# Patient Record
Sex: FEMALE | Race: WHITE | NOT HISPANIC OR LATINO | Employment: FULL TIME | URBAN - METROPOLITAN AREA
[De-identification: names, ages, dates, MRNs, and addresses within clinical notes are randomized per-mention and may not be internally consistent; named-entity substitution may affect disease eponyms.]

---

## 2022-09-11 ENCOUNTER — OFFICE VISIT (OUTPATIENT)
Dept: URGENT CARE | Facility: URGENT CARE | Age: 27
End: 2022-09-11
Payer: OTHER MISCELLANEOUS

## 2022-09-11 ENCOUNTER — NURSE TRIAGE (OUTPATIENT)
Dept: NURSING | Facility: CLINIC | Age: 27
End: 2022-09-11

## 2022-09-11 ENCOUNTER — ANCILLARY PROCEDURE (OUTPATIENT)
Dept: GENERAL RADIOLOGY | Facility: CLINIC | Age: 27
End: 2022-09-11
Attending: FAMILY MEDICINE
Payer: COMMERCIAL

## 2022-09-11 VITALS
TEMPERATURE: 98.6 F | OXYGEN SATURATION: 98 % | DIASTOLIC BLOOD PRESSURE: 85 MMHG | SYSTOLIC BLOOD PRESSURE: 130 MMHG | HEART RATE: 62 BPM

## 2022-09-11 DIAGNOSIS — S99.912A ANKLE INJURY, LEFT, INITIAL ENCOUNTER: Primary | ICD-10-CM

## 2022-09-11 PROCEDURE — 73610 X-RAY EXAM OF ANKLE: CPT | Mod: TC | Performed by: STUDENT IN AN ORGANIZED HEALTH CARE EDUCATION/TRAINING PROGRAM

## 2022-09-11 PROCEDURE — 99204 OFFICE O/P NEW MOD 45 MIN: CPT | Performed by: FAMILY MEDICINE

## 2022-09-11 RX ORDER — NORETHINDRONE ACETATE AND ETHINYL ESTRADIOL 1MG-20(21)
1 KIT ORAL DAILY
COMMUNITY
Start: 2022-01-13

## 2022-09-11 RX ORDER — MONTELUKAST SODIUM 10 MG/1
10 TABLET ORAL
COMMUNITY
Start: 2022-01-13

## 2022-09-11 RX ORDER — CETIRIZINE HYDROCHLORIDE 10 MG/1
10 TABLET ORAL
COMMUNITY
Start: 2022-01-13

## 2022-09-11 RX ORDER — KETOCONAZOLE 20 MG/ML
SHAMPOO TOPICAL
COMMUNITY
Start: 2022-01-13

## 2022-09-11 NOTE — PROGRESS NOTES
SUBJECTIVE:  Chief Complaint   Patient presents with     Urgent Care     Present for left ankle pain/injury during work on 09/07/2022.     Work Comp   .ident presents with a chief complaint of left ankle.  The injury occurred 4 days ago.   The injury happened while at work.   How: trauma immediate pain    No past medical history on file.  Allergies   Allergen Reactions     Seasonal Allergies      Social History     Tobacco Use     Smoking status: Not on file     Smokeless tobacco: Not on file   Substance Use Topics     Alcohol use: Not on file       ROSINTEGUMENTARY/SKIN: NEGATIVE for open wound/bleeding and NEGATIVE for bruising    EXAM: Gen: healthy,alert,no distress  Extremity: ankle has pain with palpation and rom.   There is not compromise to the distal circulation.  Pulses are +2 and CRT is brisk.GENERAL APPEARANCE: healthy, alert and no distress  EXTREMITIES: peripheral pulses normal  SKIN: no suspicious lesions or rashes  NEURO: Normal strength and tone, sensory exam grossly normal, mentation intact and speech normal    Xray without acute findings, no fx read by Guzman Leavitt D.O.      ICD-10-CM    1. Ankle injury, left, initial encounter  S99.912A XR Ankle Left G/E 3 Views     Ankle/Foot Bracing Supplies Order for DME - ONLY FOR DME     Ankle/Foot Bracing Supplies Order for DME - ONLY FOR DME     Orthopedic  Referral     RICE

## 2022-09-11 NOTE — TELEPHONE ENCOUNTER
Call from patient who says she was seen in urgent care. She was called and told to go back in to get fitted for walking boot. Patient asking if she should go back to urgent care or schedule for tomorrow.  Advised it would be better for her to be seen in  - since that is where she started. Also, it is unlikely that she would be able to find an appointment tomorrow.    Dharmesh Tubbs RN/[facility]      Reason for Disposition    Health Information question, no triage required and triager able to answer question    Protocols used: INFORMATION ONLY CALL - NO TRIAGE-A-

## 2022-09-12 ENCOUNTER — MYC MEDICAL ADVICE (OUTPATIENT)
Dept: URGENT CARE | Facility: URGENT CARE | Age: 27
End: 2022-09-12

## 2022-09-13 ENCOUNTER — OFFICE VISIT (OUTPATIENT)
Dept: ORTHOPEDICS | Facility: CLINIC | Age: 27
End: 2022-09-13
Attending: FAMILY MEDICINE
Payer: OTHER MISCELLANEOUS

## 2022-09-13 DIAGNOSIS — S99.912A ANKLE INJURY, LEFT, INITIAL ENCOUNTER: ICD-10-CM

## 2022-09-13 DIAGNOSIS — S93.492A SPRAIN OF ANTERIOR TALOFIBULAR LIGAMENT OF LEFT ANKLE, INITIAL ENCOUNTER: Primary | ICD-10-CM

## 2022-09-13 PROCEDURE — 99204 OFFICE O/P NEW MOD 45 MIN: CPT | Performed by: PREVENTIVE MEDICINE

## 2022-09-13 NOTE — LETTER
9/13/2022      RE: Ruben Hamilton  [address]  [place] [address]     Dear Colleague,    Thank you for referring your patient, Ruben Hamilton, to the [facility]. Please see a copy of my visit note below.    HISTORY OF PRESENT ILLNESS  Ms. Yuriy Hamilton is a pleasant 27 year old year old female who presents to clinic today for left ankle sprain. Patient reports she is a . She has not returned back to work since her date of injury.     Background:   Date of injury: 9/7/22  Duration of symptoms: 6 days  Mechanism of Injury: reports she tripped in her sandals stepping off a curb.   Intensity: 4/10  Aggravating factors: use of stairs, plantarflexion, dorsiflexion, sitting in one position for an extended period of time.   Relieving Factors: air cast brace, ice, rest, elevation and ibuprofen.   Prior Evaluation: Seen on 9/11/22 for left ankle injury, there was an XR obtained which revealed a fracture.       Location: left ankle        MEDICAL HISTORY  There is no problem list on file for this patient.      Current Outpatient Medications   Medication Sig Dispense Refill     cetirizine (ZYRTEC) 10 MG tablet Take 10 mg by mouth       ketoconazole (NIZORAL) 2 % external shampoo        montelukast (SINGULAIR) 10 MG tablet Take 10 mg by mouth       norethindrone-ethinyl estradiol (JUNEL FE 1/20) 1-20 MG-MCG tablet Take 1 tablet by mouth daily         Allergies   Allergen Reactions     Seasonal Allergies        No family history on file.  Social History     Socioeconomic History     Marital status: Single       Additional medical/Social/Surgical histories reviewed in [facility] and updated as appropriate.     REVIEW OF SYSTEMS (9/13/2022)  10 point ROS of systems including Constitutional, Eyes, Respiratory, Cardiovascular, Gastroenterology, Genitourinary, Integumentary, Musculoskeletal, Psychiatric, Allergic/Immunologic were all negative  except for pertinent positives noted in my HPI.     PHYSICAL EXAM  VSS  Vital Signs: There were no vitals taken for this visit. Patient declined being weighed. There is no height or weight on file to calculate BMI.    General  - normal appearance, in no obvious distress  HEENT  - conjunctivae not injected, moist mucous membranes, normocephalic/atraumatic head, ears normal appearance, no lesions, mouth normal appearance, no scars, normal dentition and teeth present  CV  - normal pulses at posterior tib and dorsalis pedis  Pulm  - normal respiratory pattern, non-labored  Musculoskeletal -left ankle  - stance: gait slightly favors affected side, reluctant to bear weight  - inspection: moderate swelling laterally, normal bone and joint alignment, no obvious deformity  - palpation: tenderness over ATFL, no tenderness over lateral or medial malleoli, navicular, or base of 5th MT  - ROM: intact globally but limited secondary to pain  - strength: 4/5 in eversion, 5/5 in all other planes  - special tests:  pain with inversion stress  (-) anterior drawer  (-) talar tilt  (-) Tinel's  (-) squeeze test  (-) Mclean test  Neuro  - no sensory or motor deficit, grossly normal coordination, normal muscle tone  Skin  - no ecchymosis overlying lateral foot-ankle junction, no warmth or induration, no obvious rash  Psych  - interactive, appropriate, normal mood and affect  ASSESSMENT & PLAN  28 yo female with left ankle sprain    I independently reviewed the following imaging studies:  xrays shows no fractures  Given HEP  Given ankle brace   Can return to work in 1 week  Letter provided  F/u if condition worsens  Appropriate PPE was utilized for prevention of spread of Covid-19.  Silvio Toney MD, CAQSM      DME FITTING    Relevant Diagnosis: Left Ankle sprain  ankle brace was fit on patient's Left ankle.     Person(s) involved in teaching:   Patient    Brace was applied in standard Manner:  Yes  Brace fit well:  Yes  Patient reports  brace to fit comfortably:  Yes    Education:   Patient shown self application and removal of brace: Yes  Patient shown how to adjust brace fit, if necessary: Yes  Patient educated on billing and return policy: Yes  Patient confirmed understanding when and how to contact clinic with concerns: Yes    Again, thank you for allowing me to participate in the care of your patient.      Sincerely,    Silvio Toney MD

## 2022-09-13 NOTE — PROGRESS NOTES
HISTORY OF PRESENT ILLNESS  Ms. Yuriy Hamilton is a pleasant 27 year old year old female who presents to clinic today for left ankle sprain. Patient reports she is a . She has not returned back to work since her date of injury.     Background:   Date of injury: 9/7/22  Duration of symptoms: 6 days  Mechanism of Injury: reports she tripped in her sandals stepping off a curb.   Intensity: 4/10  Aggravating factors: use of stairs, plantarflexion, dorsiflexion, sitting in one position for an extended period of time.   Relieving Factors: air cast brace, ice, rest, elevation and ibuprofen.   Prior Evaluation: Seen on 9/11/22 for left ankle injury, there was an XR obtained which revealed a fracture.       Location: left ankle        MEDICAL HISTORY  There is no problem list on file for this patient.      Current Outpatient Medications   Medication Sig Dispense Refill     cetirizine (ZYRTEC) 10 MG tablet Take 10 mg by mouth       ketoconazole (NIZORAL) 2 % external shampoo        montelukast (SINGULAIR) 10 MG tablet Take 10 mg by mouth       norethindrone-ethinyl estradiol (JUNEL FE 1/20) 1-20 MG-MCG tablet Take 1 tablet by mouth daily         Allergies   Allergen Reactions     Seasonal Allergies        No family history on file.  Social History     Socioeconomic History     Marital status: Single       Additional medical/Social/Surgical histories reviewed in [facility] and updated as appropriate.     REVIEW OF SYSTEMS (9/13/2022)  10 point ROS of systems including Constitutional, Eyes, Respiratory, Cardiovascular, Gastroenterology, Genitourinary, Integumentary, Musculoskeletal, Psychiatric, Allergic/Immunologic were all negative except for pertinent positives noted in my HPI.     PHYSICAL EXAM  VSS  Vital Signs: There were no vitals taken for this visit. Patient declined being weighed. There is no height or weight on file to calculate BMI.    General  - normal appearance, in no obvious distress  HEENT  -  conjunctivae not injected, moist mucous membranes, normocephalic/atraumatic head, ears normal appearance, no lesions, mouth normal appearance, no scars, normal dentition and teeth present  CV  - normal pulses at posterior tib and dorsalis pedis  Pulm  - normal respiratory pattern, non-labored  Musculoskeletal -left ankle  - stance: gait slightly favors affected side, reluctant to bear weight  - inspection: moderate swelling laterally, normal bone and joint alignment, no obvious deformity  - palpation: tenderness over ATFL, no tenderness over lateral or medial malleoli, navicular, or base of 5th MT  - ROM: intact globally but limited secondary to pain  - strength: 4/5 in eversion, 5/5 in all other planes  - special tests:  pain with inversion stress  (-) anterior drawer  (-) talar tilt  (-) Tinel's  (-) squeeze test  (-) Mclean test  Neuro  - no sensory or motor deficit, grossly normal coordination, normal muscle tone  Skin  - no ecchymosis overlying lateral foot-ankle junction, no warmth or induration, no obvious rash  Psych  - interactive, appropriate, normal mood and affect  ASSESSMENT & PLAN  28 yo female with left ankle sprain    I independently reviewed the following imaging studies:  xrays shows no fractures  Given HEP  Given ankle brace   Can return to work in 1 week  Letter provided  F/u if condition worsens  Appropriate PPE was utilized for prevention of spread of Covid-19.  Silvio Toney MD, CAQSM      DME FITTING    Relevant Diagnosis: Left Ankle sprain  ankle brace was fit on patient's Left ankle.     Person(s) involved in teaching:   Patient    Brace was applied in standard Manner:  Yes  Brace fit well:  Yes  Patient reports brace to fit comfortably:  Yes    Education:   Patient shown self application and removal of brace: Yes  Patient shown how to adjust brace fit, if necessary: Yes  Patient educated on billing and return policy: Yes  Patient confirmed understanding when and how to contact clinic  with concerns: Yes

## 2022-09-19 ENCOUNTER — MYC MEDICAL ADVICE (OUTPATIENT)
Dept: ORTHOPEDICS | Facility: CLINIC | Age: 27
End: 2022-09-19

## 2022-11-19 ENCOUNTER — HEALTH MAINTENANCE LETTER (OUTPATIENT)
Age: 27
End: 2022-11-19

## 2023-04-09 ENCOUNTER — HEALTH MAINTENANCE LETTER (OUTPATIENT)
Age: 28
End: 2023-04-09

## 2024-06-16 ENCOUNTER — HEALTH MAINTENANCE LETTER (OUTPATIENT)
Age: 29
End: 2024-06-16

## 2025-06-21 ENCOUNTER — HEALTH MAINTENANCE LETTER (OUTPATIENT)
Age: 30
End: 2025-06-21